# Patient Record
Sex: MALE | Race: WHITE | NOT HISPANIC OR LATINO | Employment: FULL TIME | URBAN - METROPOLITAN AREA
[De-identification: names, ages, dates, MRNs, and addresses within clinical notes are randomized per-mention and may not be internally consistent; named-entity substitution may affect disease eponyms.]

---

## 2020-12-23 ENCOUNTER — HOSPITAL ENCOUNTER (EMERGENCY)
Facility: HOSPITAL | Age: 30
Discharge: HOME | End: 2020-12-24
Attending: STUDENT IN AN ORGANIZED HEALTH CARE EDUCATION/TRAINING PROGRAM
Payer: OTHER GOVERNMENT

## 2020-12-23 DIAGNOSIS — B02.9 THIGH SHINGLES: Primary | ICD-10-CM

## 2020-12-23 PROCEDURE — 99284 EMERGENCY DEPT VISIT MOD MDM: CPT

## 2020-12-23 PROCEDURE — U0002 COVID-19 LAB TEST NON-CDC: HCPCS

## 2020-12-23 PROCEDURE — 87502 INFLUENZA DNA AMP PROBE: CPT

## 2020-12-24 VITALS
WEIGHT: 162.06 LBS | DIASTOLIC BLOOD PRESSURE: 59 MMHG | OXYGEN SATURATION: 97 % | BODY MASS INDEX: 26.05 KG/M2 | RESPIRATION RATE: 20 BRPM | HEIGHT: 66 IN | HEART RATE: 80 BPM | SYSTOLIC BLOOD PRESSURE: 110 MMHG | TEMPERATURE: 100 F

## 2020-12-24 LAB
INFLUENZA A, MOLECULAR: NEGATIVE
INFLUENZA B, MOLECULAR: NEGATIVE
SARS-COV-2 RDRP RESP QL NAA+PROBE: NEGATIVE
SPECIMEN SOURCE: NORMAL

## 2020-12-24 RX ORDER — OXYCODONE AND ACETAMINOPHEN 10; 325 MG/1; MG/1
1 TABLET ORAL EVERY 6 HOURS PRN
Qty: 12 TABLET | Refills: 0 | Status: SHIPPED | OUTPATIENT
Start: 2020-12-24

## 2020-12-24 RX ORDER — LIDOCAINE 50 MG/G
1 PATCH TOPICAL DAILY
Qty: 15 PATCH | Refills: 0 | Status: SHIPPED | OUTPATIENT
Start: 2020-12-24

## 2020-12-24 RX ORDER — ACYCLOVIR 800 MG/1
800 TABLET ORAL
Qty: 40 TABLET | Refills: 0 | Status: SHIPPED | OUTPATIENT
Start: 2020-12-24 | End: 2021-12-24

## 2020-12-24 NOTE — ED PROVIDER NOTES
"Dictation #1  MRN:[id number]  CSN:[id number]  SCRIBE #1 NOTE: I, Ayesha Ndiaye, am scribing for, and in the presence of, Filiberto Robles MD. I have scribed the entire note.       History     Chief Complaint   Patient presents with    Rash     pt has a large area of painful redness noted to his left hip. He also reports a sores in his butt crack that went away. He c/o bilateral swollen groin glands that are painful to touch      Review of patient's allergies indicates:  No Known Allergies      History of Present Illness     HPI    12/24/2020, 12:22 AM  History obtained from the patient      History of Present Illness: Madi Peres is a 30 y.o. male patient who presents to the Emergency Department for evaluation of rash. Patient reports two months ago he noticed "two sores to [his] but crack" that have since subsided. He states the sores were mildly itchy. x6 weeks ago he reports L groin swelling with L hip pain. This morning he woke up with a large, hard, erythematous, raised patch to his left butt cheek/thigh. No exacerbating factors reported. Patient denies any fever, chills, numbness, weakness, n/vd/, abd pain, CP, SOB, and all other sxs at this time. Prior Tx includes neosporin with minimal relief. No further complaints or concerns at this time.       Arrival mode: Personal vehicle     PCP: Primary Doctor No      Past Medical History:  Past medical history reviewed not relevant      Past Surgical History:  Past surgical history reviewed not relevant      Family History:  Family history reviewed not relevant      Social History:  Social History    Social History Main Topics    Social History Main Topics    Smoking status: Unknown if ever smoked    Smokeless tobacco: Unknown if ever used    Alcohol Use: Unknown drinking history    Drug Use: Unknown if ever used    Sexual Activity: Unknown        Review of Systems     Review of Systems   Constitutional: Negative for chills and fever.   HENT: Negative for sore " throat.    Respiratory: Negative for shortness of breath.    Cardiovascular: Negative for chest pain.   Gastrointestinal: Negative for abdominal pain, diarrhea, nausea and vomiting.   Genitourinary: Negative for dysuria.        (+) L groin swelling   Musculoskeletal: Negative for back pain.        (+) L hip pain   Skin: Positive for rash (large, hard, erythematous, raised patch to L hip).   Neurological: Negative for weakness and numbness.   Hematological: Does not bruise/bleed easily.   All other systems reviewed and are negative.     Physical Exam     Initial Vitals [12/23/20 2332]   BP Pulse Resp Temp SpO2   (!) 115/58 105 16 100.3 °F (37.9 °C) 100 %      MAP       --          Physical Exam  Nursing Notes and Vital Signs Reviewed.  Constitutional: Patient is in no acute distress. Well-developed and well-nourished.  Head: Atraumatic. Normocephalic.  Eyes: PERRL. EOM intact. Conjunctivae are not pale. No scleral icterus.  ENT: Mucous membranes are moist. Oropharynx is clear and symmetric.    Neck: Supple. Full ROM. No lymphadenopathy.  Cardiovascular: Regular rate. Regular rhythm. No murmurs, rubs, or gallops. Distal pulses are 2+ and symmetric.  Pulmonary/Chest: No respiratory distress. Clear to auscultation bilaterally. No wheezing or rales.  Abdominal: Soft and non-distended.  There is no tenderness.  No rebound, guarding, or rigidity. Good bowel sounds.  Genitourinary: No CVA tenderness  Musculoskeletal: Moves all extremities. No obvious deformities. No edema. No calf tenderness.  Skin: Warm and dry. 1.5 cm palpable lymphoid to L inguinal area. Edematous, erythematous, blanching rash in dermatomal distribution to Left butt cheek/thigh.  Neurological:  Alert, awake, and appropriate.  Normal speech.  No acute focal neurological deficits are appreciated.  Psychiatric: Normal affect. Good eye contact. Appropriate in content.     ED Course   Procedures  ED Vital Signs:  Vitals:    12/23/20 2332 12/24/20 0008  "12/24/20 0105   BP: (!) 115/58 (!) 119/58 (!) 110/59   Pulse: 105 88 80   Resp: 16  20   Temp: 100.3 °F (37.9 °C)  100 °F (37.8 °C)   TempSrc: Oral  Oral   SpO2: 100% 96% 97%   Weight: 73.5 kg (162 lb 0.6 oz)     Height: 5' 6" (1.676 m)         Abnormal Lab Results:  Labs Reviewed   INFLUENZA A & B BY MOLECULAR   SARS-COV-2 RNA AMPLIFICATION, QUAL   HIV 1 / 2 ANTIBODY   HEPATITIS C ANTIBODY        All Lab Results:  Results for orders placed or performed during the hospital encounter of 12/23/20   Influenza A & B by Molecular    Specimen: Nasopharyngeal Swab   Result Value Ref Range    Influenza A, Molecular Negative Negative    Influenza B, Molecular Negative Negative    Flu A & B Source Nasal swab    COVID-19 Rapid Screening   Result Value Ref Range    SARS-CoV-2 RNA, Amplification, Qual Negative Negative       Imaging Results:  Imaging Results    None                 The Emergency Provider reviewed the vital signs and test results, which are outlined above.     ED Discussion     12:34 AM: Assessed pt at this time.  Discussed with pt all pertinent ED information and results. Discussed pt dx and plan of tx. Gave pt all f/u and return to the ED instructions. All questions and concerns were addressed at this time. Pt expresses understanding of information and instructions, and is comfortable with plan to discharge. Pt is stable for discharge.    I discussed with patient and/or family/caretaker that evaluation in the ED does not suggest any emergent or life threatening medical conditions requiring immediate intervention beyond what was provided in the ED, and I believe patient is safe for discharge.  Regardless, an unremarkable evaluation in the ED does not preclude the development or presence of a serious of life threatening condition. As such, patient was instructed to return immediately for any worsening or change in current symptoms.         Medical Decision Making:   Clinical Tests:   Lab Tests: Reviewed and " Ordered    30-year-old male presents with 1 day of painful raised erythematous edematous rash over the left Buttock.  No fascicular lesions, but this is early in course.  Dermatomal distribution makes rashes other than shingles unlikely.  Given pain control, antivirals, and lidocaine patches.  Also told utilize topical resources as needed. Told to return to ED if symptoms worsen, return, or change in character.             ED Medication(s):  Medications - No data to display    Discharge Medication List as of 12/24/2020 12:37 AM      START taking these medications    Details   acyclovir (ZOVIRAX) 800 MG Tab Take 1 tablet (800 mg total) by mouth 5 (five) times daily., Starting Thu 12/24/2020, Until Fri 12/24/2021, Print      lidocaine (LIDODERM) 5 % Place 1 patch onto the skin once daily. Remove & Discard patch within 12 hours or as directed by MD, Starting u 12/24/2020, Print      oxyCODONE-acetaminophen (PERCOCET)  mg per tablet Take 1 tablet by mouth every 6 (six) hours as needed for Pain., Starting u 12/24/2020, Print             Follow-up Information     PCP of your choice. Schedule an appointment as soon as possible for a visit in 5 days.    Why: For follow-up on today's visit.           Go to  [facility] - .    Specialty: Emergency Medicine  Why: As needed, If symptoms worsen  Contact information:  [id number] [facility] [address] [phone]                     Scribe Attestation:   Scribe #1: I performed the above scribed service and the documentation accurately describes the services I performed. I attest to the accuracy of the note.     Attending:   Physician Attestation Statement for Scribe #1: I, Filiberto Robles MD, personally performed the services described in this documentation, as scribed by Ayesha Ndiaye, in my presence, and it is both accurate and complete.           Clinical Impression       ICD-10-CM ICD-9-CM   1. Thigh shingles  B02.9 053.9        Disposition:   Disposition: Discharged  Condition: Stable         Filiberto Robles MD  12/25/20 0991